# Patient Record
Sex: FEMALE | Race: AMERICAN INDIAN OR ALASKA NATIVE
[De-identification: names, ages, dates, MRNs, and addresses within clinical notes are randomized per-mention and may not be internally consistent; named-entity substitution may affect disease eponyms.]

---

## 2019-04-27 ENCOUNTER — HOSPITAL ENCOUNTER (EMERGENCY)
Dept: HOSPITAL 5 - ED | Age: 29
LOS: 1 days | Discharge: LEFT BEFORE DISCHARGE | End: 2019-04-28
Payer: SELF-PAY

## 2019-04-27 DIAGNOSIS — K08.89: Primary | ICD-10-CM

## 2019-04-27 DIAGNOSIS — Z53.21: ICD-10-CM

## 2019-04-28 VITALS — SYSTOLIC BLOOD PRESSURE: 151 MMHG | DIASTOLIC BLOOD PRESSURE: 102 MMHG

## 2019-10-05 ENCOUNTER — HOSPITAL ENCOUNTER (EMERGENCY)
Dept: HOSPITAL 5 - ED | Age: 29
Discharge: HOME | End: 2019-10-05
Payer: MEDICAID

## 2019-10-05 VITALS — SYSTOLIC BLOOD PRESSURE: 148 MMHG | DIASTOLIC BLOOD PRESSURE: 97 MMHG

## 2019-10-05 DIAGNOSIS — N39.0: Primary | ICD-10-CM

## 2019-10-05 DIAGNOSIS — F17.200: ICD-10-CM

## 2019-10-05 DIAGNOSIS — A64: ICD-10-CM

## 2019-10-05 LAB
BILIRUB UR QL STRIP: (no result)
BLOOD UR QL VISUAL: (no result)
MUCOUS THREADS #/AREA URNS HPF: (no result) /HPF
PH UR STRIP: 6 [PH] (ref 5–7)
PROT UR STRIP-MCNC: (no result) MG/DL
RBC #/AREA URNS HPF: 4 /HPF (ref 0–6)
UROBILINOGEN UR-MCNC: < 2 MG/DL (ref ?–2)
WBC #/AREA URNS HPF: 16 /HPF (ref 0–6)

## 2019-10-05 PROCEDURE — 99283 EMERGENCY DEPT VISIT LOW MDM: CPT

## 2019-10-05 PROCEDURE — 87086 URINE CULTURE/COLONY COUNT: CPT

## 2019-10-05 PROCEDURE — 96372 THER/PROPH/DIAG INJ SC/IM: CPT

## 2019-10-05 PROCEDURE — 81001 URINALYSIS AUTO W/SCOPE: CPT

## 2019-10-05 PROCEDURE — 81025 URINE PREGNANCY TEST: CPT

## 2019-10-05 NOTE — EMERGENCY DEPARTMENT REPORT
ED Female  HPI





- General


Chief complaint: Urogenital-Female


Stated complaint: VAGINAL DISCHARGE


Time Seen by Provider: 10/05/19 05:09


Source: patient


Mode of arrival: Ambulatory


Limitations: No Limitations





- History of Present Illness


Initial comments: 


 Patient is a 29-year-old -American female who presents with abdominal 

pain vaginal discharge patient presents with partner for treatment for STD state

discharges Y yellow malodorous  last menstrual cycle 2 weeks ago there is no 

vaginal bleeding no fever no chills no nausea vomiting patient's partner treated

earlier today for STD





MD Complaint: vaginal discharge


Onset/Timin


-: week(s)


Location: suprapubic


Radiation: suprapubic


Severity: moderate


Severity scale (0 -10): 4


Quality: cramping


Consistency: intermittent


Improves with: none


Worsens with: none


Are you Pregnant Now?: No


Last Menstrual Period: 19


EDC: 20


Associated Symptoms: vaginal discharge, abdominal pain (cramping overall), 

dysuria.  denies: vaginal bleeding, nausea/vomiting, hematuria, rash





- Related Data


Sexually active: Yes


                                  Previous Rx's











 Medication  Instructions  Recorded  Last Taken  Type


 


predniSONE [Deltasone] 20 mg PO QDAY #5 tab 07/23/15 Unknown Rx


 


Nitrofurantoin Mono/M-Cryst 100 mg PO BID 7 Days #14 capsule 10/05/19 Unknown Rx





[Macrobid CAP]    


 


metroNIDAZOLE [Flagyl] 500 mg PO BID 7 Days #14 tab 10/05/19 Unknown Rx











                                    Allergies











Allergy/AdvReac Type Severity Reaction Status Date / Time


 


peanut Allergy  Swelling Verified 07/23/15 00:41


 


Penicillins Allergy  Swelling Verified 10/05/19 04:51














ED Review of Systems


ROS: 


Stated complaint: VAGINAL DISCHARGE


Other details as noted in HPI





Constitutional: denies: chills, fever


Eyes: denies: eye pain, eye discharge, vision change


ENT: denies: ear pain, throat pain


Respiratory: denies: cough, shortness of breath, wheezing


Cardiovascular: denies: chest pain, palpitations


Endocrine: no symptoms reported


Gastrointestinal: abdominal pain.  denies: nausea, diarrhea


Genitourinary: urgency, dysuria, frequency, discharge (white yellow ).  denies: 

hematuria, dyspareunia


Musculoskeletal: denies: back pain, joint swelling, arthralgia


Skin: denies: rash, lesions


Neurological: denies: headache, weakness, paresthesias


Psychiatric: denies: anxiety, depression


Hematological/Lymphatic: denies: easy bleeding, easy bruising





ED Past Medical Hx





- Past Medical History


Previous Medical History?: No





- Surgical History


Past Surgical History?: Yes


Additional Surgical History: skin graft (right hand)





- Social History


Smoking Status: Current Every Day Smoker


Substance Use Type: Marijuana





- Medications


Home Medications: 


                                Home Medications











 Medication  Instructions  Recorded  Confirmed  Last Taken  Type


 


predniSONE [Deltasone] 20 mg PO QDAY #5 tab 07/23/15  Unknown Rx


 


Nitrofurantoin Mono/M-Cryst 100 mg PO BID 7 Days #14 capsule 10/05/19  Unknown 

Rx





[Macrobid CAP]     


 


metroNIDAZOLE [Flagyl] 500 mg PO BID 7 Days #14 tab 10/05/19  Unknown Rx














ED Physical Exam





- General


Limitations: No Limitations


General appearance: alert, in no apparent distress





- Head


Head exam: Present: atraumatic, normocephalic





- Eye


Eye exam: Present: normal appearance, PERRL, EOMI


Pupils: Present: normal accommodation





- ENT


ENT exam: Present: mucous membranes moist





- Neck


Neck exam: Present: normal inspection





- Respiratory


Respiratory exam: Present: normal lung sounds bilaterally.  Absent: respiratory 

distress, wheezes, stridor, chest wall tenderness (eyebrow)





- Cardiovascular


Cardiovascular Exam: Present: regular rate, normal rhythm, normal heart sounds. 

 Absent: systolic murmur, diastolic murmur, rubs, gallop





- GI/Abdominal


GI/Abdominal exam: Present: soft (movement bilaterally), normal bowel sounds.  

Absent: distended, tenderness (is a broad malaise the same area), bruit, hernia





- Rectal


Rectal exam: Present: deferred





- 


External exam: Present: normal external exam


Speculum exam: Present: erythema, vaginal discharge (white yellow malodorous), 

cervical discharge


Bi-manual exam: Absent: cervical motion tendernes





- Extremities Exam


Extremities exam: Present: normal inspection, full ROM, normal capillary refill





- Back Exam


Back exam: Present: normal inspection, full ROM.  Absent: tenderness, CVA 

tenderness (R), CVA tenderness (L), muscle spasm, rash noted





- Neurological Exam


Neurological exam: Present: alert, oriented X3, CN II-XII intact, normal gait





- Psychiatric


Psychiatric exam: Present: normal affect, normal mood





- Skin


Skin exam: Present: warm, dry, intact, normal color.  Absent: rash





ED Course


                                   Vital Signs











  10/05/19





  04:49


 


Temperature 98.6 F


 


Pulse Rate 77


 


Respiratory 16





Rate 


 


Blood Pressure 148/97


 


O2 Sat by Pulse 99





Oximetry 














ED Medical Decision Making





- Lab Data








                                   Lab Results











  10/05/19 Range/Units





  Unknown 


 


Urine Color  Yellow  (Yellow)  


 


Urine Turbidity  Slightly-cloudy  (Clear)  


 


Urine pH  6.0  (5.0-7.0)  


 


Ur Specific Gravity  1.017  (1.003-1.030)  


 


Urine Protein  <15 mg/dl  (Negative)  mg/dL


 


Urine Glucose (UA)  Neg  (Negative)  mg/dL


 


Urine Ketones  Neg  (Negative)  mg/dL


 


Urine Blood  Sm  (Negative)  


 


Urine Nitrite  Neg  (Negative)  


 


Urine Bilirubin  Neg  (Negative)  


 


Urine Urobilinogen  < 2.0  (<2.0)  mg/dL


 


Ur Leukocyte Esterase  Sm  (Negative)  


 


Urine WBC (Auto)  16.0 H  (0.0-6.0)  /HPF


 


Urine RBC (Auto)  4.0  (0.0-6.0)  /HPF


 


U Epithel Cells (Auto)  6.0  (0-13.0)  /HPF


 


Urine Mucus  Few  /HPF


 


Urine HCG, Qual  Negative  (Negative)  














- Medical Decision Making


this is a STD tx'd with rocephin, azithromycin, will dc to home with rx for 

flagyl and macrobid follow up with health department for HSV and HIV screening 





Critical care attestation.: 


If time is entered above; I have spent that time in minutes in the direct care 

of this critically ill patient, excluding procedure time.








ED Disposition


Clinical Impression: 


 STD (female)





UTI (urinary tract infection)


Qualifiers:


 Urinary tract infection type: acute cystitis Hematuria presence: without 

hematuria Qualified Code(s): N30.00 - Acute cystitis without hematuria





Disposition: DC-01 TO HOME OR SELFCARE


Is pt being admited?: No


Does the pt Need Aspirin: No


Condition: Stable


Instructions:  Sexually Transmitted Diseases (ED), Urinary Tract Infection in 

Women (ED)


Prescriptions: 


metroNIDAZOLE [Flagyl] 500 mg PO BID 7 Days #14 tab


Nitrofurantoin Mono/M-Cryst [Macrobid CAP] 100 mg PO BID 7 Days #14 capsule


Forms:  Work/School Release Form(ED)


Time of Disposition: 06:30

## 2022-04-13 ENCOUNTER — HOSPITAL ENCOUNTER (EMERGENCY)
Dept: HOSPITAL 5 - ED | Age: 32
Discharge: HOME | End: 2022-04-13
Payer: MEDICAID

## 2022-04-13 VITALS — DIASTOLIC BLOOD PRESSURE: 81 MMHG | SYSTOLIC BLOOD PRESSURE: 116 MMHG

## 2022-04-13 DIAGNOSIS — N39.0: Primary | ICD-10-CM

## 2022-04-13 DIAGNOSIS — Z88.0: ICD-10-CM

## 2022-04-13 DIAGNOSIS — Z91.010: ICD-10-CM

## 2022-04-13 DIAGNOSIS — F17.200: ICD-10-CM

## 2022-04-13 LAB
BACTERIA #/AREA URNS HPF: (no result) /HPF
BILIRUB UR QL STRIP: (no result)
BLOOD UR QL VISUAL: (no result)
MUCOUS THREADS #/AREA URNS HPF: (no result) /HPF
PH UR STRIP: 5 [PH] (ref 5–7)
RBC #/AREA URNS HPF: 28 /HPF (ref 0–6)
UROBILINOGEN UR-MCNC: 2 MG/DL (ref ?–2)
WBC #/AREA URNS HPF: > 182 /HPF (ref 0–6)

## 2022-04-13 PROCEDURE — 99283 EMERGENCY DEPT VISIT LOW MDM: CPT

## 2022-04-13 PROCEDURE — 81025 URINE PREGNANCY TEST: CPT

## 2022-04-13 PROCEDURE — 81001 URINALYSIS AUTO W/SCOPE: CPT

## 2022-04-13 NOTE — EMERGENCY DEPARTMENT REPORT
ED Female  HPI





- General


Chief complaint: Urogenital-Female


Stated complaint: POSSIBLE BLADDER/UTI


Source: patient


Mode of arrival: Ambulatory


Limitations: No Limitations





- History of Present Illness


Initial comments: 





32-year-old female [facility] complaining of a few day history 

increased urinary frequency decreased urinary production burning with urination 

but no discharge.  Flank pain to the right side off and on.  No nausea vomiting,

hemoptysis symptoms hematochezia.  No chest pain palpitations


MD Complaint: dysuria


-: Gradual


Location: suprapubic


Radiation: non-radiating


Quality: burning


Consistency: constant


Worsens with: urination


Are you Pregnant Now?: No


Associated Symptoms: dysuria, hematuria.  denies: vaginal discharge, vaginal 

bleeding, abdominal pain, shortness of breath, syncope





- Related Data


Sexually active: Yes


                                  Previous Rx's











 Medication  Instructions  Recorded  Last Taken  Type


 


predniSONE [Deltasone] 20 mg PO QDAY #5 tab 07/23/15 Unknown Rx


 


Nitrofurantoin Mono/M-Cryst 100 mg PO BID 7 Days #14 capsule 10/05/19 Unknown Rx





[Macrobid CAP]    


 


metroNIDAZOLE [Flagyl] 500 mg PO BID 7 Days #14 tab 10/05/19 Unknown Rx


 


Ciprofloxacin HCl 500 mg PO BID #20 04/13/22 Unknown Rx


 


Phenazopyridine [Pyridium] 200 mg PO PC #9 tablet 04/13/22 Unknown Rx











                                    Allergies











Allergy/AdvReac Type Severity Reaction Status Date / Time


 


peanut Allergy  Swelling Verified 07/23/15 00:41


 


Penicillins Allergy  Swelling Verified 10/05/19 04:51














ED Review of Systems


ROS: 


Stated complaint: POSSIBLE BLADDER/UTI


Other details as noted in HPI





Comment: All other systems reviewed and negative





ED Past Medical Hx





- Surgical History


Additional Surgical History: skin graft (right hand)





- Social History


Smoking Status: Current Every Day Smoker


Substance Use Type: Marijuana





- Medications


Home Medications: 


                                Home Medications











 Medication  Instructions  Recorded  Confirmed  Last Taken  Type


 


predniSONE [Deltasone] 20 mg PO QDAY #5 tab 07/23/15  Unknown Rx


 


Nitrofurantoin Mono/M-Cryst 100 mg PO BID 7 Days #14 capsule 10/05/19  Unknown 

Rx





[Macrobid CAP]     


 


metroNIDAZOLE [Flagyl] 500 mg PO BID 7 Days #14 tab 10/05/19  Unknown Rx


 


Ciprofloxacin HCl 500 mg PO BID #20 04/13/22  Unknown Rx


 


Phenazopyridine [Pyridium] 200 mg PO PC #9 tablet 04/13/22  Unknown Rx














ED Physical Exam





- General


Limitations: No Limitations


General appearance: alert, in no apparent distress





- Head


Head exam: Present: atraumatic, normocephalic





- Eye


Eye exam: Present: normal appearance, PERRL, EOMI


Pupils: Present: normal accommodation





- ENT


ENT exam: Present: normal exam, normal orophraynx, mucous membranes moist





- Neck


Neck exam: Present: normal inspection, full ROM





- Respiratory


Respiratory exam: Present: normal lung sounds bilaterally.  Absent: respiratory 

distress





- Cardiovascular


Cardiovascular Exam: Present: regular rate, normal rhythm.  Absent: systolic 

murmur, diastolic murmur, rubs, gallop





- GI/Abdominal


GI/Abdominal exam: Present: soft, normal bowel sounds





- Extremities Exam


Extremities exam: Present: normal inspection, normal capillary refill





- Back Exam


Back exam: Present: normal inspection.  Absent: CVA tenderness (R), CVA 

tenderness (L)





- Neurological Exam


Neurological exam: Present: alert, oriented X3, CN II-XII intact, normal gait





- Psychiatric


Psychiatric exam: Present: normal affect, normal mood.  Absent: anxious, flat 

affect





- Skin


Skin exam: Present: warm, dry, intact, normal color.  Absent: rash, cyanosis, 

diaphoretic





ED Course





                                   Vital Signs











  04/13/22





  16:57


 


Temperature 102.2 F H


 


Pulse Rate 98 H


 


Respiratory 18





Rate 


 


Blood Pressure 134/85


 


O2 Sat by Pulse 97





Oximetry 











Critical care attestation.: 


If time is entered above; I have spent that time in minutes in the direct care 

of this critically ill patient, excluding procedure time.








ED Disposition


Clinical Impression: 


 UTI (urinary tract infection)





Disposition: 01 HOME / SELF CARE / HOMELESS


Is pt being admited?: No


Does the pt Need Aspirin: No


Condition: Fair


Instructions:  Pyelonephritis, Adult, Urinalysis Test, Antibiotic Medicine, 

Adult, Easy-to-Read, Phenazopyridine tablets, Urinary Tract Infection, Adult, 

Urodynamic Testing, Easy-to-Read


Prescriptions: 


Ciprofloxacin HCl 500 mg PO BID #20


Phenazopyridine [Pyridium] 200 mg PO PC #9 tablet


Referrals: 


ROB CHANCE MD [Primary Care Provider] - 3-5 Days